# Patient Record
Sex: FEMALE | Race: WHITE | NOT HISPANIC OR LATINO | URBAN - METROPOLITAN AREA
[De-identification: names, ages, dates, MRNs, and addresses within clinical notes are randomized per-mention and may not be internally consistent; named-entity substitution may affect disease eponyms.]

---

## 2019-06-04 ENCOUNTER — APPOINTMENT (RX ONLY)
Dept: URBAN - METROPOLITAN AREA CLINIC 31 | Facility: CLINIC | Age: 84
Setting detail: DERMATOLOGY
End: 2019-06-04

## 2019-06-04 DIAGNOSIS — L81.4 OTHER MELANIN HYPERPIGMENTATION: ICD-10-CM

## 2019-06-04 DIAGNOSIS — L82.1 OTHER SEBORRHEIC KERATOSIS: ICD-10-CM

## 2019-06-04 DIAGNOSIS — Z71.89 OTHER SPECIFIED COUNSELING: ICD-10-CM

## 2019-06-04 DIAGNOSIS — D22 MELANOCYTIC NEVI: ICD-10-CM

## 2019-06-04 DIAGNOSIS — Z85.828 PERSONAL HISTORY OF OTHER MALIGNANT NEOPLASM OF SKIN: ICD-10-CM

## 2019-06-04 DIAGNOSIS — D18.0 HEMANGIOMA: ICD-10-CM

## 2019-06-04 PROBLEM — E78.5 HYPERLIPIDEMIA, UNSPECIFIED: Status: ACTIVE | Noted: 2019-06-04

## 2019-06-04 PROBLEM — L57.0 ACTINIC KERATOSIS: Status: ACTIVE | Noted: 2019-06-04

## 2019-06-04 PROBLEM — D18.01 HEMANGIOMA OF SKIN AND SUBCUTANEOUS TISSUE: Status: ACTIVE | Noted: 2019-06-04

## 2019-06-04 PROBLEM — D22.5 MELANOCYTIC NEVI OF TRUNK: Status: ACTIVE | Noted: 2019-06-04

## 2019-06-04 PROCEDURE — ? COUNSELING

## 2019-06-04 PROCEDURE — 99203 OFFICE O/P NEW LOW 30 MIN: CPT

## 2019-06-04 ASSESSMENT — LOCATION DETAILED DESCRIPTION DERM
LOCATION DETAILED: NASAL DORSUM
LOCATION DETAILED: LEFT MEDIAL UPPER BACK
LOCATION DETAILED: LEFT RIB CAGE
LOCATION DETAILED: EPIGASTRIC SKIN
LOCATION DETAILED: LEFT MEDIAL SUPERIOR CHEST

## 2019-06-04 ASSESSMENT — LOCATION SIMPLE DESCRIPTION DERM
LOCATION SIMPLE: NOSE
LOCATION SIMPLE: CHEST
LOCATION SIMPLE: ABDOMEN
LOCATION SIMPLE: LEFT UPPER BACK

## 2019-06-04 ASSESSMENT — LOCATION ZONE DERM
LOCATION ZONE: NOSE
LOCATION ZONE: TRUNK

## 2019-06-04 NOTE — HPI: FULL BODY SKIN EXAMINATION
How Severe Are Your Spot(S)?: mild
What Is The Reason For Today's Visit?: Full Body Skin Examination
What Is The Reason For Today's Visit? (Being Monitored For X): concerning skin lesions on an annual basis
Additional History: Patient denies any changing moles or bleeding spots

## 2019-08-26 ENCOUNTER — APPOINTMENT (OUTPATIENT)
Dept: ADMISSIONS | Facility: MEDICAL CENTER | Age: 84
End: 2019-08-26
Attending: INTERNAL MEDICINE
Payer: MEDICARE

## 2019-08-26 RX ORDER — METOPROLOL SUCCINATE 25 MG/1
12.5 TABLET, EXTENDED RELEASE ORAL
COMMUNITY

## 2019-08-26 RX ORDER — MAGNESIUM OXIDE 400 MG/1
400 TABLET ORAL
COMMUNITY

## 2019-08-26 RX ORDER — ATORVASTATIN CALCIUM 40 MG/1
40 TABLET, FILM COATED ORAL NIGHTLY
COMMUNITY

## 2019-08-26 SDOH — HEALTH STABILITY: MENTAL HEALTH: HOW OFTEN DO YOU HAVE A DRINK CONTAINING ALCOHOL?: NEVER

## 2019-08-26 NOTE — OR NURSING
Preadmit appt:  Patient instructed to continue regularly prescribed medications through day before surgery.  Instructed to take the following medications the day of surgery with a sip of water per anesthesia protocol: none. Pt takes meds at night.

## 2019-08-27 ENCOUNTER — ANESTHESIA (OUTPATIENT)
Dept: SURGERY | Facility: MEDICAL CENTER | Age: 84
End: 2019-08-27
Payer: MEDICARE

## 2019-08-27 ENCOUNTER — APPOINTMENT (OUTPATIENT)
Dept: RADIOLOGY | Facility: MEDICAL CENTER | Age: 84
End: 2019-08-27
Attending: INTERNAL MEDICINE
Payer: MEDICARE

## 2019-08-27 ENCOUNTER — HOSPITAL ENCOUNTER (OUTPATIENT)
Facility: MEDICAL CENTER | Age: 84
End: 2019-08-27
Attending: INTERNAL MEDICINE | Admitting: INTERNAL MEDICINE
Payer: MEDICARE

## 2019-08-27 ENCOUNTER — ANESTHESIA EVENT (OUTPATIENT)
Dept: SURGERY | Facility: MEDICAL CENTER | Age: 84
End: 2019-08-27
Payer: MEDICARE

## 2019-08-27 VITALS
TEMPERATURE: 97.7 F | SYSTOLIC BLOOD PRESSURE: 112 MMHG | RESPIRATION RATE: 18 BRPM | HEART RATE: 88 BPM | WEIGHT: 114.64 LBS | OXYGEN SATURATION: 96 % | HEIGHT: 64 IN | DIASTOLIC BLOOD PRESSURE: 64 MMHG | BODY MASS INDEX: 19.57 KG/M2

## 2019-08-27 LAB
EKG IMPRESSION: NORMAL
GLUCOSE BLD-MCNC: 97 MG/DL (ref 65–99)
PATHOLOGY CONSULT NOTE: NORMAL
PATHOLOGY CONSULT NOTE: NORMAL

## 2019-08-27 PROCEDURE — 88307 TISSUE EXAM BY PATHOLOGIST: CPT

## 2019-08-27 PROCEDURE — 160203 HCHG ENDO MINUTES - 1ST 30 MINS LEVEL 4: Performed by: INTERNAL MEDICINE

## 2019-08-27 PROCEDURE — 160048 HCHG OR STATISTICAL LEVEL 1-5: Performed by: INTERNAL MEDICINE

## 2019-08-27 PROCEDURE — 160009 HCHG ANES TIME/MIN: Performed by: INTERNAL MEDICINE

## 2019-08-27 PROCEDURE — 160035 HCHG PACU - 1ST 60 MINS PHASE I: Performed by: INTERNAL MEDICINE

## 2019-08-27 PROCEDURE — 160046 HCHG PACU - 1ST 60 MINS PHASE II: Performed by: INTERNAL MEDICINE

## 2019-08-27 PROCEDURE — 160025 RECOVERY II MINUTES (STATS): Performed by: INTERNAL MEDICINE

## 2019-08-27 PROCEDURE — 700102 HCHG RX REV CODE 250 W/ 637 OVERRIDE(OP): Performed by: ANESTHESIOLOGY

## 2019-08-27 PROCEDURE — 700111 HCHG RX REV CODE 636 W/ 250 OVERRIDE (IP): Performed by: ANESTHESIOLOGY

## 2019-08-27 PROCEDURE — 93010 ELECTROCARDIOGRAM REPORT: CPT | Performed by: INTERNAL MEDICINE

## 2019-08-27 PROCEDURE — 88172 CYTP DX EVAL FNA 1ST EA SITE: CPT

## 2019-08-27 PROCEDURE — 502240 HCHG MISC OR SUPPLY RC 0272: Performed by: INTERNAL MEDICINE

## 2019-08-27 PROCEDURE — 700105 HCHG RX REV CODE 258: Performed by: INTERNAL MEDICINE

## 2019-08-27 PROCEDURE — 88173 CYTOPATH EVAL FNA REPORT: CPT

## 2019-08-27 PROCEDURE — C1769 GUIDE WIRE: HCPCS | Performed by: INTERNAL MEDICINE

## 2019-08-27 PROCEDURE — 82962 GLUCOSE BLOOD TEST: CPT

## 2019-08-27 PROCEDURE — 93005 ELECTROCARDIOGRAM TRACING: CPT | Performed by: INTERNAL MEDICINE

## 2019-08-27 PROCEDURE — C1876 STENT, NON-COA/NON-COV W/DEL: HCPCS | Performed by: INTERNAL MEDICINE

## 2019-08-27 PROCEDURE — 110371 HCHG SHELL REV 272: Performed by: INTERNAL MEDICINE

## 2019-08-27 PROCEDURE — 160002 HCHG RECOVERY MINUTES (STAT): Performed by: INTERNAL MEDICINE

## 2019-08-27 PROCEDURE — 88305 TISSUE EXAM BY PATHOLOGIST: CPT

## 2019-08-27 PROCEDURE — 160208 HCHG ENDO MINUTES - EA ADDL 1 MIN LEVEL 4: Performed by: INTERNAL MEDICINE

## 2019-08-27 PROCEDURE — 700101 HCHG RX REV CODE 250: Performed by: ANESTHESIOLOGY

## 2019-08-27 PROCEDURE — 160036 HCHG PACU - EA ADDL 30 MINS PHASE I: Performed by: INTERNAL MEDICINE

## 2019-08-27 PROCEDURE — A9270 NON-COVERED ITEM OR SERVICE: HCPCS | Performed by: ANESTHESIOLOGY

## 2019-08-27 DEVICE — STENT BILIARY WALLFLEX 10-40: Type: IMPLANTABLE DEVICE | Status: FUNCTIONAL

## 2019-08-27 RX ORDER — MEPERIDINE HYDROCHLORIDE 25 MG/ML
12.5 INJECTION INTRAMUSCULAR; INTRAVENOUS; SUBCUTANEOUS
Status: DISCONTINUED | OUTPATIENT
Start: 2019-08-27 | End: 2019-08-27 | Stop reason: HOSPADM

## 2019-08-27 RX ORDER — DEXAMETHASONE SODIUM PHOSPHATE 4 MG/ML
INJECTION, SOLUTION INTRA-ARTICULAR; INTRALESIONAL; INTRAMUSCULAR; INTRAVENOUS; SOFT TISSUE PRN
Status: DISCONTINUED | OUTPATIENT
Start: 2019-08-27 | End: 2019-08-27

## 2019-08-27 RX ORDER — PIPERACILLIN SODIUM, TAZOBACTAM SODIUM 3; .375 G/15ML; G/15ML
INJECTION, POWDER, LYOPHILIZED, FOR SOLUTION INTRAVENOUS PRN
Status: DISCONTINUED | OUTPATIENT
Start: 2019-08-27 | End: 2019-08-27 | Stop reason: SURG

## 2019-08-27 RX ORDER — DIPHENHYDRAMINE HYDROCHLORIDE 50 MG/ML
12.5 INJECTION INTRAMUSCULAR; INTRAVENOUS
Status: DISCONTINUED | OUTPATIENT
Start: 2019-08-27 | End: 2019-08-27 | Stop reason: HOSPADM

## 2019-08-27 RX ORDER — SCOLOPAMINE TRANSDERMAL SYSTEM 1 MG/1
PATCH, EXTENDED RELEASE TRANSDERMAL
Status: DISCONTINUED
Start: 2019-08-27 | End: 2019-08-27 | Stop reason: HOSPADM

## 2019-08-27 RX ORDER — NEOSTIGMINE METHYLSULFATE 1 MG/ML
INJECTION, SOLUTION INTRAVENOUS PRN
Status: DISCONTINUED | OUTPATIENT
Start: 2019-08-27 | End: 2019-08-27 | Stop reason: SURG

## 2019-08-27 RX ORDER — OXYCODONE HCL 5 MG/5 ML
10 SOLUTION, ORAL ORAL
Status: DISCONTINUED | OUTPATIENT
Start: 2019-08-27 | End: 2019-08-27 | Stop reason: HOSPADM

## 2019-08-27 RX ORDER — HYDROMORPHONE HYDROCHLORIDE 1 MG/ML
0.4 INJECTION, SOLUTION INTRAMUSCULAR; INTRAVENOUS; SUBCUTANEOUS
Status: DISCONTINUED | OUTPATIENT
Start: 2019-08-27 | End: 2019-08-27 | Stop reason: HOSPADM

## 2019-08-27 RX ORDER — PIPERACILLIN SODIUM, TAZOBACTAM SODIUM 3; .375 G/15ML; G/15ML
INJECTION, POWDER, LYOPHILIZED, FOR SOLUTION INTRAVENOUS
Status: DISCONTINUED
Start: 2019-08-27 | End: 2019-08-27 | Stop reason: HOSPADM

## 2019-08-27 RX ORDER — HALOPERIDOL 5 MG/ML
1 INJECTION INTRAMUSCULAR
Status: DISCONTINUED | OUTPATIENT
Start: 2019-08-27 | End: 2019-08-27 | Stop reason: HOSPADM

## 2019-08-27 RX ORDER — CHLORHEXIDINE GLUCONATE ORAL RINSE 1.2 MG/ML
SOLUTION DENTAL
Status: DISCONTINUED
Start: 2019-08-27 | End: 2019-08-27 | Stop reason: HOSPADM

## 2019-08-27 RX ORDER — SODIUM CHLORIDE, SODIUM LACTATE, POTASSIUM CHLORIDE, CALCIUM CHLORIDE 600; 310; 30; 20 MG/100ML; MG/100ML; MG/100ML; MG/100ML
INJECTION, SOLUTION INTRAVENOUS CONTINUOUS
Status: DISCONTINUED | OUTPATIENT
Start: 2019-08-27 | End: 2019-08-27 | Stop reason: HOSPADM

## 2019-08-27 RX ORDER — HYDROMORPHONE HYDROCHLORIDE 1 MG/ML
0.1 INJECTION, SOLUTION INTRAMUSCULAR; INTRAVENOUS; SUBCUTANEOUS
Status: DISCONTINUED | OUTPATIENT
Start: 2019-08-27 | End: 2019-08-27 | Stop reason: HOSPADM

## 2019-08-27 RX ORDER — ONDANSETRON 2 MG/ML
4 INJECTION INTRAMUSCULAR; INTRAVENOUS
Status: DISCONTINUED | OUTPATIENT
Start: 2019-08-27 | End: 2019-08-27 | Stop reason: HOSPADM

## 2019-08-27 RX ORDER — HYDROMORPHONE HYDROCHLORIDE 1 MG/ML
0.2 INJECTION, SOLUTION INTRAMUSCULAR; INTRAVENOUS; SUBCUTANEOUS
Status: DISCONTINUED | OUTPATIENT
Start: 2019-08-27 | End: 2019-08-27 | Stop reason: HOSPADM

## 2019-08-27 RX ORDER — OXYCODONE HCL 5 MG/5 ML
5 SOLUTION, ORAL ORAL
Status: DISCONTINUED | OUTPATIENT
Start: 2019-08-27 | End: 2019-08-27 | Stop reason: HOSPADM

## 2019-08-27 RX ORDER — LIDOCAINE HYDROCHLORIDE 40 MG/ML
SOLUTION TOPICAL PRN
Status: DISCONTINUED | OUTPATIENT
Start: 2019-08-27 | End: 2019-08-27 | Stop reason: SURG

## 2019-08-27 RX ORDER — MIDAZOLAM HYDROCHLORIDE 2 MG/ML
SYRUP ORAL PRN
Status: DISCONTINUED | OUTPATIENT
Start: 2019-08-27 | End: 2019-08-27 | Stop reason: SURG

## 2019-08-27 RX ORDER — LIDOCAINE HYDROCHLORIDE 40 MG/ML
SOLUTION TOPICAL PRN
Status: DISCONTINUED | OUTPATIENT
Start: 2019-08-27 | End: 2019-08-27

## 2019-08-27 RX ORDER — SCOLOPAMINE TRANSDERMAL SYSTEM 1 MG/1
PATCH, EXTENDED RELEASE TRANSDERMAL PRN
Status: DISCONTINUED | OUTPATIENT
Start: 2019-08-27 | End: 2019-08-27 | Stop reason: SURG

## 2019-08-27 RX ORDER — DEXAMETHASONE SODIUM PHOSPHATE 4 MG/ML
INJECTION, SOLUTION INTRA-ARTICULAR; INTRALESIONAL; INTRAMUSCULAR; INTRAVENOUS; SOFT TISSUE PRN
Status: DISCONTINUED | OUTPATIENT
Start: 2019-08-27 | End: 2019-08-27 | Stop reason: SURG

## 2019-08-27 RX ADMIN — PIPERACILLIN AND TAZOBACTAM 3.38 G: 3; .375 INJECTION, POWDER, LYOPHILIZED, FOR SOLUTION INTRAVENOUS; PARENTERAL at 16:00

## 2019-08-27 RX ADMIN — LIDOCAINE HYDROCHLORIDE 160 MG: 40 SOLUTION TOPICAL at 15:59

## 2019-08-27 RX ADMIN — MIDAZOLAM HYDROCHLORIDE 1 MG: 1 INJECTION, SOLUTION INTRAMUSCULAR; INTRAVENOUS at 15:58

## 2019-08-27 RX ADMIN — FENTANYL CITRATE 25 MCG: 50 INJECTION, SOLUTION INTRAMUSCULAR; INTRAVENOUS at 16:02

## 2019-08-27 RX ADMIN — PROPOFOL 100 MG: 10 INJECTION, EMULSION INTRAVENOUS at 16:01

## 2019-08-27 RX ADMIN — FENTANYL CITRATE 50 MCG: 50 INJECTION, SOLUTION INTRAMUSCULAR; INTRAVENOUS at 15:58

## 2019-08-27 RX ADMIN — GLYCOPYRROLATE 0.2 MG: 0.2 INJECTION INTRAMUSCULAR; INTRAVENOUS at 16:32

## 2019-08-27 RX ADMIN — SCOPALAMINE 1 PATCH: 1 PATCH, EXTENDED RELEASE TRANSDERMAL at 16:34

## 2019-08-27 RX ADMIN — DEXAMETHASONE SODIUM PHOSPHATE 10 MG: 4 INJECTION, SOLUTION INTRAMUSCULAR; INTRAVENOUS at 16:41

## 2019-08-27 RX ADMIN — MIDAZOLAM HYDROCHLORIDE 1 MG: 2 SYRUP ORAL at 16:09

## 2019-08-27 RX ADMIN — NEOSTIGMINE METHYLSULFATE 2.5 MG: 1 INJECTION INTRAVENOUS at 16:32

## 2019-08-27 RX ADMIN — SODIUM CHLORIDE, POTASSIUM CHLORIDE, SODIUM LACTATE AND CALCIUM CHLORIDE: 600; 310; 30; 20 INJECTION, SOLUTION INTRAVENOUS at 15:58

## 2019-08-27 ASSESSMENT — PAIN SCALES - GENERAL: PAIN_LEVEL: 1

## 2019-08-27 NOTE — OR SURGEON
Immediate Post OP Note    PreOp Diagnosis: Pancreas mass, bile duct stricture    PostOp Diagnosis: Same    Procedure(s):  GASTROSCOPY - Wound Class: Clean Contaminated  EGD, WITH ENDOSCOPIC US - UPPER - Wound Class: Clean Contaminated  EGD, WITH ASPIRATION BIOPSY - FNA - Wound Class: Clean Contaminated  ERCP, DIAGNOSTIC - W/METAL STENT - Wound Class: Clean Contaminated  DESTRUCTION, CELIAC PLEXUS, USING NEUROLYTIC AGENT - Wound Class: Clean Contaminated    Surgeon(s):  Matt Jean-Baptiste M.D.    Anesthesiologist/Type of Anesthesia:  Anesthesiologist: Venkat Mason M.D./General    Surgical Staff:  Circulator: Cristina Gutierres R.N.  Endoscopy Technician: Oksana Sorto; Mary Grace Gonzalez    Specimens removed if any:  ID Type Source Tests Collected by Time Destination   A : Neck of Pancreas Tissue Pancreas PATHOLOGY SPECIMEN Matt Jean-Baptiste M.D. 8/27/2019  4:12 PM    B : Core of Neck of Pancreas Tissue Pancreas PATHOLOGY SPECIMEN Matt Jean-Baptiste M.D. 8/27/2019  4:16 PM        Dr. Jean-Baptiste  [facility] [place]  [phone]    EGD    EUS with: FNA    Celiac plexus neurolysis    ERCP with: Bile duct stent removal    Pancreas duct stent removal    Bile duct stone extraction    Bile duct uncovered metal bile duct stent placement    Radiologic interpretation of static and dynamic fluoroscopic images    Indication: Pancreas mass    Bile duct stricture    Sedation: GA (English)    Findings:   EGD    Esophagus     Unremarkable mucosa    Stomach     Unremarkable mucosa    Duodenum     Extrinsic compression of sweep     Unremarkable mucosa     Biliary and pancreatic stents in place     EUS    Celiac axis     No adenopathy    Pancreas body/tail     Atrophic     Hypoechoic, heterogeneous    Pancreatic duct     Tortuous     Generous caliber    Head of pancreas     Normal dorsal / ventral transition     Unremarkable parenchyma    Neck of pancreas mass     Heterogeneous, hypoechoic     Well defined     20.4mm x  18.0mm     Encasing pancreatic duct and bile duct     Loss of interface with portal vein     FNA 22G Acquire     **  PRELIMINARY PATHOLOGY - ATYPICAL CELLS, FAVOR MALIGNANCY  **      Ampulla     Obscured by stents    CBD     Stent in place     Encased by tumor at neck of pancreas     ERCP    Ampulla     Stents in place     Post sphincterotomy anatomy    Pancreatic duct     Neither injected nor cannulated    CBD     Intrahepatic biliary tree unremarkable     Cystic duct patent     Stricture of distal 1/3 of duct     Small stones extracted with balloon sweep     Therapeutics    Celiac plexus neurolysis     5mL NS cushion     20mL Bupivacaine - 0.25%     20mL Dehydrated alcohol - 98%     5mL NS flush    Bile duct stent removal with snare    Pancreas duct stent removal with snare    Bile duct stone extraction with 9-12mm balloon    Bile duct uncovered metal bile duct stent placement - 10mm x 40mm    Plan:  Follow up final pathology    Check CA 19-9    Follow liver enzymes    Follow up with oncology    Follow up with [facility]      8/27/2019 4:53 PM Matt Jean-Baptiste M.D.

## 2019-08-27 NOTE — ANESTHESIA PROCEDURE NOTES
Airway  Date/Time: 8/27/2019 3:59 PM  Performed by: Venkat Mason M.D.  Authorized by: Venkat Mason M.D.     Location:  OR  Urgency:  Elective  Indications for Airway Management:  Anesthesia  Spontaneous Ventilation: absent    Sedation Level:  Deep  Preoxygenated: Yes    Patient Position:  Sniffing  Final Airway Type:  Endotracheal airway  Final Endotracheal Airway:  ETT  Cuffed: Yes    Technique Used for Successful ETT Placement:  Direct laryngoscopy  Insertion Site:  Oral  Blade Type:  Yesica  Laryngoscope Blade/Videolaryngoscope Blade Size:  3  ETT Size (mm):  7.0  Measured from:  Teeth  ETT to Teeth (cm):  22  Placement Verified by: auscultation and capnometry    Cormack-Lehane Classification:  Grade IIa - partial view of glottis  Number of Attempts at Approach:  1

## 2019-08-27 NOTE — ANESTHESIA PREPROCEDURE EVALUATION
Relevant Problems   No relevant active problems       Physical Exam    Anesthesia Plan    ASA 3   ASA physical status 3 criteria: CAD/stents (> 3 months), COPD and MI or angina - history (> 3 months)    Plan - general       Airway plan will be ETT        Induction: intravenous          Informed Consent:    Anesthetic plan and risks discussed with patient.

## 2019-08-27 NOTE — ANESTHESIA PREPROCEDURE EVALUATION
Relevant Problems   No relevant active problems       Physical Exam    Airway   Mallampati: II  TM distance: >3 FB  Neck ROM: full       Cardiovascular - normal exam  Rhythm: regular  Rate: normal  (-) murmur     Dental - normal exam         Pulmonary - normal exam  Breath sounds clear to auscultation     Abdominal   Abdomen: soft  Bowel sounds: normal   Neurological - normal exam                 Anesthesia Plan    ASA 3   ASA physical status 3 criteria: CAD/stents (> 3 months), respiratory insufficiency or compromise and MI or angina - history (> 3 months)    Plan - general       Airway plan will be ETT        Induction: intravenous          Informed Consent:    Anesthetic plan and risks discussed with patient.

## 2019-08-28 NOTE — ANESTHESIA TIME REPORT
Anesthesia Start and Stop Event Times     Date Time Event    8/27/2019 1542 Ready for Procedure     1558 Anesthesia Start     1701 Anesthesia Stop        Responsible Staff  08/27/19    Name Role Begin End    Venkat Mason M.D. Anesth 1558 1701        Preop Diagnosis (Free Text):  Pre-op Diagnosis     MASS OF PANCREAS        Preop Diagnosis (Codes):    Post op Diagnosis  Pancreatic mass      Premium Reason  Non-Premium    Comments:

## 2019-08-28 NOTE — ANESTHESIA POSTPROCEDURE EVALUATION
Patient: Shahana Delgado    Procedure Summary     Date:  08/27/19 Room / Location:   ENDOSCOPIC ULTRASOUND ROOM / SURGERY [facility]    Anesthesia Start:  1558 Anesthesia Stop:  1701    Procedures:       GASTROSCOPY      EGD, WITH ENDOSCOPIC US - UPPER      EGD, WITH ASPIRATION BIOPSY - FNA      ERCP, DIAGNOSTIC - W/METAL STENT      DESTRUCTION, CELIAC PLEXUS, USING NEUROLYTIC AGENT Diagnosis:  (head of pancreas mass)    Surgeon:  Mtat Jean-Baptiste M.D. Responsible Provider:  Venkat Mason M.D.    Anesthesia Type:  general ASA Status:  3          Final Anesthesia Type: general  Last vitals  BP   Blood Pressure : 112/64    Temp   36.7 °C (98.1 °F)    Pulse   Pulse: 88   Resp   12    SpO2   97 %      Anesthesia Post Evaluation    Patient location during evaluation: PACU  Patient participation: complete - patient participated  Level of consciousness: awake and alert  Pain score: 1    Airway patency: patent  Anesthetic complications: no  Cardiovascular status: hemodynamically stable  Respiratory status: acceptable  Hydration status: euvolemic    PONV: none           Nurse Pain Score: 3 (NPRS)

## 2019-08-28 NOTE — PROCEDURES
DATE OF SERVICE:  08/27/2019    PROCEDURES:  1.  Esophagogastroduodenoscopy.  2.  Endoscopic ultrasound with fine needle aspiration and celiac plexus   neurolysis.  3.  ERCP with bile duct stent removal, pancreatic duct stent removal, bile   duct stone extraction, bile duct uncovered metal biliary stent placement,   radiologic interpretation of static and dynamic fluoroscopic images by myself,   at no time was a radiologist present in the room.    INDICATION:  Pancreatic mass, bile duct stricture.    FINAL IMPRESSION:  ESOPHAGOGASTRODUODENOSCOPY FINDINGS:  1.  Esophagus:  Unremarkable mucosa.  2.  Stomach:  Unremarkable mucosa.  3.  Duodenum:  Extrinsic compression of duodenal sweep, unremarkable mucosa,   biliary and pancreatic stents in place.    ENDOSCOPIC ULTRASOUND FINDINGS:  1.  Celiac axis:  No adenopathy.  2.  Pancreatic body and tail:  Atrophic, hypoechoic, heterogeneous.  3.  Pancreatic duct:  Tortuous and generous caliber.  4.  Head of pancreas:  Normal dorsal ventral transition.  Unremarkable   parenchyma.  5.  Neck of pancreas mass:  Heterogeneous, hypoechoic, well defined, measuring   at least 20.4x18.0 mm, encasing pancreatic duct and bile duct.  Loss of   interface with portal vein.  Fine needle aspiration performed.  6.  Biliary ampulla:  Obscured by biliary stents.  7.  Common bile duct:  Stent in place.  Encased by tumor at neck of pancreas.    Preliminary pathology positive for atypical cells, favor malignancy.    ENDOSCOPIC RETROGRADE CHOLANGIOPANCREATOGRAPHY FINDINGS:  1.  Biliary ampulla:  Stents seen in place.  Post-sphincterotomy anatomy.  2.  Pancreatic duct:  Neither injected nor cannulated.  3.  Common bile duct:  Intrahepatic biliary tree unremarkable.  Cystic duct   patent.  Stricture of the distal one-third of bile duct consistent with lesion   at the neck of the pancreas.  Small stones extracted with balloon sweep.    THERAPEUTICS:  1.  Celiac plexus neurolysis performed in usual  fashion.  2.  Bile duct stent removal with snare.  3.  Pancreatic duct stent removal with snare.  4.  Bile duct stone extraction with 9-12 mm balloon.  5.  Bile duct uncovered [employer] bile duct stent placed, 10x40 mm.    RECOMMENDATIONS:  1.  Follow up final pathology.  2.  Check CA 19-9.  3.  Follow liver enzymes.  4.  Follow up with oncology.  5.  [facility].    DESCRIPTION OF PROCEDURE:  Prior to the procedure, physical exam was stable.    During procedure, vital signs remained within normal limits.  Prior to   sedation, informed consent was obtained.  Risks, benefits, alternatives   including, but not limited to risk of bleeding, infection, perforation,   adverse reaction to medication, failure to identify pathology, pancreatitis   and death explained to the patient and her daughter who accepted all risks.    Patient was intubated, sedated by anesthesia, prepped in the prone position.    ESOPHAGOGASTRODUODENOSCOPY:  Scope tip of the Olympus flexible gastroscope   passed into the proximal esophagus.  Proximal middle and distal thirds of the   esophagus were well visualized and were unremarkable.  Stomach was entered.    Gastric pool suctioned, air insufflated, scope retroflexed to view the body,   fundus and cardia, then straightened to view the antrum and incisura, which   were unremarkable.  The pylorus was patent.  Duodenal bulb was entered and was   unremarkable through the duodenal sweep, there was extrinsic compression,   suspicious for extrinsic tumor.  The overlying mucosa was unremarkable, as was   the mucosa in the second and third portion of the duodenum.  In the second   portion of the duodenum, the biliary ampulla was identified and 2 separate   stents were seen emanating from it.  The gastroscope was withdrawn and we   proceeded with endoscopic ultrasound.    ENDOSCOPIC ULTRASOUND:  The flexible radial echoendoscope passed into the   proximal stomach.   Celiac axis was unremarkable with no adenopathy.  Imaging   of the pancreatic body and tail showed an atrophic gland, which was hypoechoic   and heterogeneous, suggestive for downstream obstruction.  The pancreatic   duct was tortuous and generous in caliber.  The scope was advanced into the   duodenum.  The head of the pancreas showed normal dorsal ventral transition   and unremarkable parenchyma.  The biliary ampulla was obscured by biliary   stents.  The common bile duct had stents seen in place and was encased by   tumor at the neck of the pancreas.  In the neck of the pancreas, a   heterogeneous, hypoechoic, well-defined mass was seen measuring at least   18.0x20.4 mm.  This encased the pancreatic duct and the bile duct in the neck   of the pancreas.  There was loss of interface with portal vein consistent with   tumor invasion.  No surrounding adenopathy was appreciated.  The radial   echoendoscope was withdrawn and the flexible linear echoendoscope passed to   the level of the head of the pancreas.  A 22-gauge [employer] Acquire   needle was utilized for fine needle aspiration with multiple passes and   multiple throws with each pass.  Finally, the pathologist called back to the   room reporting that atypical cells were seen, favoring malignancy, but also   some inflammatory cells were seen.  Based on this presumptive positive   diagnosis, the linear echoendoscope was then withdrawn back to the celiac   axis, which was easily identified.  A 19-gauge standard [employer]   needle was utilized to enter the celiac space and celiac plexus block was made   with 3 mL of normal saline cushion, 20 mL of bupivacaine 0.25%, 20 mL of 98%   dehydrated alcohol and a normal saline flush.  Prior to each injection, the   needle tip was confirmed on endoscopic ultrasound and aspiration was made and   was negative for blood.  Once this was complete, the endoscopic ultrasound was   deemed complete and we proceed  with endoscopic retrograde   cholangiopancreatography.    ENDOSCOPIC RETROGRADE CHOLANGIOPANCREATOGRAPHY:  The side-viewing flexible TJF   duodenoscope was passed to the level of the ampulla in the short position.    The biliary ampulla showed 2 separate stents in place, consistent with the   known pancreatic stent and bile duct stent.  These stents were snared   individually at the scope tip and withdrawn through the scope channel.  There   was post-sphincterotomy anatomy seen at the ampulla.  Biliary ampulla was   cannulated on the very first attempt with a 9-12 mm balloon with a 0.035 wire.    The wire passed along the expected course of the bile duct into the   intrahepatic tree.  This was followed with the balloon, which was inflated at   the distal duct and injection of contrast was made.  This demonstrated a   stricture of the distal third of the duct.  This was consistent with a   stricture in the neck of the pancreas.  The intrahepatic biliary tree filled   and was unremarkable.  The cystic duct filled.  The balloon was deflated and   followed up the wire into the common hepatic duct, inflated and withdrawn down   the duct with mild resistance and extraction of multiple small soft stone   fragments as well as some sludge.  Repeat sweeps were negative.  The wire was   left in place and the strictured area was measured. A 6 cm metal biliary stent   was chosen and passed over the wire and across the strictured area; however,   on fluoroscopy, this was seen to be slightly longer than was desired, so the   stent was recaptured and wasted.  A 4 cm x 10 mm uncovered metal [place]   [employer]ific biliary stent was then chosen and deployed across the strictured   area with excellent results and excellent bile flow.  Once this was complete,   the procedure was deemed complete.  The scope was withdrawn.  Air and liquid   were suctioned.  Patient tolerated the procedure well and was sent to recovery   without  complications.       ____________________________________     MD SHANTI MOTLEY / DANTE    DD:  08/27/2019 18:23:35  DT:  08/27/2019 19:15:29    D#:  [id number]  Job#:  [id number]

## 2019-08-28 NOTE — DISCHARGE INSTRUCTIONS
ENDOSCOPY HOME CARE INSTRUCTIONS    GASTROSCOPY OR ERCP  1. Don't eat or drink anything for about an hour after the test. You can then resume your regular diet.  2. Don't drive or drink alcohol for 24 hours. The medication you received will make you too drowsy.  3. Don't take any coffee, tea, or aspirin products until after you see your doctor. These can harm the lining of your stomach.  4. If you begin to vomit bloody material, or develop black or bloody stools, call your doctor as soon as possible.  5. If you have any neck, chest, abdominal pain or temp of 100 degrees, call your doctor.    DR. PRUITT: [phone].    EGD     EUS with:        FNA                          Celiac plexus neurolysis     ERCP with:      Bile duct stent removal                          Pancreas duct stent removal                          Bile duct stone extraction                          Bile duct uncovered metal bile duct stent placement                          Radiologic interpretation of static and dynamic fluoroscopic images     Indication:        Pancreas mass                          Bile duct stricture     Sedation:         GA (English)     Findings:              EGD                          Esophagus                                      Unremarkable mucosa                          Stomach                                      Unremarkable mucosa                          Duodenum                                      Extrinsic compression of sweep                                      Unremarkable mucosa                                      Biliary and pancreatic stents in place                 EUS                          Celiac axis                                      No adenopathy                          Pancreas body/tail                                      Atrophic                                      Hypoechoic, heterogeneous                          Pancreatic duct                                      Tortuous                                       Generous caliber                          Head of pancreas                                      Normal dorsal / ventral transition                                      Unremarkable parenchyma                          Neck of pancreas mass                                      Heterogeneous, hypoechoic                                      Well defined                                      20.4mm x 18.0mm                                      Encasing pancreatic duct and bile duct                                      Loss of interface with portal vein                                      FNA 22G Acquire                 **  PRELIMINARY PATHOLOGY - ATYPICAL CELLS, FAVOR MALIGNANCY  **                             Ampulla                                      Obscured by stents                          CBD                                      Stent in place                                      Encased by tumor at neck of pancreas                 ERCP                          Ampulla                                      Stents in place                                      Post sphincterotomy anatomy                          Pancreatic duct                                      Neither injected nor cannulated                          CBD                                      Intrahepatic biliary tree unremarkable                                      Cystic duct patent                                      Stricture of distal 1/3 of duct                                      Small stones extracted with balloon sweep                 Therapeutics                          Celiac plexus neurolysis                                      5mL     NS cushion                                      20mL   Bupivacaine - 0.25%                                      20mL   Dehydrated alcohol - 98%                                      5mL     NS flush                          Bile duct stent removal with snare                           Pancreas duct stent removal with snare                          Bile duct stone extraction with 9-12mm balloon                          Bile duct uncovered metal bile duct stent placement - 10mm x 40mm     Plan:                Follow up final pathology                          Check CA 19-9                          Follow liver enzymes                          Follow up with oncology                          Follow [facility] with [facility]        8/27/2019 4:53 PM Matt Jean-Baptiste M.D.               You should call 911 if you develop problems with breathing or chest pain.  If any questions arise, call your doctor. If your doctor is not available, please feel free to call {Endoscopy Dept Numbers:[id number]}. You can also call the HEALTH HOTLINE open 24 hours/day, 7 days/week and speak to a nurse at [phone], or toll free [phone].    Depression / Suicide Risk    As you are discharged from this [facility] facility, it is important to learn how to keep safe from harming yourself.    Recognize the warning signs:  · Abrupt changes in personality, positive or negative- including increase in energy   · Giving away possessions  · Change in eating patterns- significant weight changes-  positive or negative  · Change in sleeping patterns- unable to sleep or sleeping all the time   · Unwillingness or inability to communicate  · Depression  · Unusual sadness, discouragement and loneliness  · Talk of wanting to die  · Neglect of personal appearance   · Rebelliousness- reckless behavior  · Withdrawal from people/activities they love  · Confusion- inability to concentrate     If you or a loved one observes any of these behaviors or has concerns about self-harm, here's what you can do:  · Talk about it- your feelings and reasons for harming yourself  · Remove any means that you might use to hurt yourself (examples: pills, rope, extension cords, firearm)  · Get professional help from the  community (Mental Health, Substance Abuse, psychological counseling)  · Do not be alone:Call your Safe Contact- someone whom you trust who will be there for you.  · Call your local CRISIS HOTLINE 263-6221 or 907-685-2065  · Call your local Children's Mobile Crisis Response Team [place] (387) 527-6437 or www.eelusion  · Call the toll free National Suicide Prevention Hotlines   · National Suicide Prevention Lifeline 032-504-SDKG (9172)  · National Hope Line Network 800-SUICIDE (907-3356)    I acknowledge receipt and understanding of these Home Care Instructions.

## 2019-09-19 ENCOUNTER — PATIENT OUTREACH (OUTPATIENT)
Dept: OTHER | Facility: MEDICAL CENTER | Age: 84
End: 2019-09-19

## 2019-09-19 NOTE — PROGRESS NOTES
Oncology nurse navigator called patient to make sure she has follow up after her pathology report.  Shahana reports that she is currently at the rehab in [place] and she does have an appointment tomorrow with Dr. Cabrera.

## 2019-10-25 ENCOUNTER — HOSPITAL ENCOUNTER (OUTPATIENT)
Dept: RADIOLOGY | Facility: MEDICAL CENTER | Age: 84
End: 2019-10-25

## (undated) DEVICE — ELECTRODE 850 FOAM ADHESIVE - HYDROGEL RADIOTRNSPRNT (50/PK)

## (undated) DEVICE — NEEDLE BIOPSY 22G W/ LOCKABLE SYRINGE FOR EBUS (5EA/BX)

## (undated) DEVICE — Device

## (undated) DEVICE — TUBE CONNECTING SUCTION - CLEAR PLASTIC STERILE 72 IN (50EA/CA)

## (undated) DEVICE — GLOVE, LITE (PAIR)

## (undated) DEVICE — CATHERTER CLEAR SINGLE USE INJECTION THERAPY NEEDLE 25GA X 4MM  2.3MM X 240CM (5EA/BX)

## (undated) DEVICE — TUBE SUCTION YANKAUER  1/4 X 6FT (20EA/CA)"

## (undated) DEVICE — NEPTUNE 4 PORT MANIFOLD - (20/PK)

## (undated) DEVICE — CAPTIVATOR II-10MM ROUND STIFF  (40/BX)

## (undated) DEVICE — EXTRACTOR PRO XL 9-12 MM ABOVE

## (undated) DEVICE — SYRINGE ALLIANCE INFLATION (5EA/BX)

## (undated) DEVICE — KIT ANESTHESIA W/CIRCUIT & 3/LT BAG W/FILTER (20EA/CA)

## (undated) DEVICE — BOVIE BLADE COATED - (50/PK)

## (undated) DEVICE — GUIDE JAGWIRE 035 STRAIGHT (2EA/BX)

## (undated) DEVICE — DEVICE HEMOSTATIC CLIPPING RESOLUTION 360 DEGREES (20EA/BX)

## (undated) DEVICE — CANNULA W/ SUPPLY TUBING O2 - (50/CA)

## (undated) DEVICE — SET EXTENSION WITH 2 PORTS (48EA/CA) ***PART #2C8610 IS A SUBSTITUTE*****

## (undated) DEVICE — TUBING CLEARLINK DUO-VENT - C-FLO (48EA/CA)

## (undated) DEVICE — STENT BILIARY WALLFLEX IARY  UNCOVERED 10X60: Type: IMPLANTABLE DEVICE | Status: NON-FUNCTIONAL

## (undated) DEVICE — MASK ANESTHESIA ADULT  - (100/CA)